# Patient Record
(demographics unavailable — no encounter records)

---

## 2024-10-23 NOTE — CHIEF COMPLAINT
[FreeTextEntry1] : LMP 8/10/24 Discontinued OC's on 8/10/24 Pregnancy test in office this am: negative c/o RLQ pain x 3 weeks intermittent but only cramping at the present time.  C/o vaginal itching x 1 1/2 weeks

## 2024-10-23 NOTE — PHYSICAL EXAM
[Chaperone Present] : A chaperone was present in the examining room during all aspects of the physical examination [09257] : A chaperone was present during the pelvic exam. [Soft] : soft [Normal] : uterus [Discharge] : a  ~M vaginal discharge was present [Moderate] : moderate [White] : white [Thin] : thin [Uterine Adnexae] : were not tender and not enlarged [FreeTextEntry2] : Marina [Tender] : non tender [Distended] : not distended [H/Smegaly] : no hepatosplenomegaly

## 2025-01-30 NOTE — DISCUSSION/SUMMARY
[FreeTextEntry1] : Discuss persistent HPV Discuss pregnancy with irregular cycles Discuss discharge and chronic BV

## 2025-01-30 NOTE — HISTORY OF PRESENT ILLNESS
[FreeTextEntry1] : The patient is here for a routine gynecological examination with Pap smear.  Her LMP was 5 weeks ago   Her periods come every 40 days   She has lost weight recently and under stress.  LH surge was today   She is using pull out for birth control     She has no recent gynecological or urinary problems

## 2025-01-30 NOTE — PHYSICAL EXAM
[Chaperone Present] : A chaperone was present in the examining room during all aspects of the physical examination [65890] : A chaperone was present during the pelvic exam. [FreeTextEntry2] : Mikki [Appropriately responsive] : appropriately responsive [Alert] : alert [No Acute Distress] : no acute distress [No Lymphadenopathy] : no lymphadenopathy [Regular Rate Rhythm] : regular rate rhythm [No Murmurs] : no murmurs [Clear to Auscultation B/L] : clear to auscultation bilaterally [Soft] : soft [Non-tender] : non-tender [Non-distended] : non-distended [No HSM] : No HSM [No Lesions] : no lesions [No Mass] : no mass [Oriented x3] : oriented x3 [Examination Of The Breasts] : a normal appearance [No Masses] : no breast masses were palpable [Labia Majora] : normal [Labia Minora] : normal [Discharge] : a  ~M vaginal discharge was present [Moderate] : moderate [Foul Smelling] : not foul smelling [White] : white [Thick] : thick [Normal] : normal [Uterine Adnexae] : normal

## 2025-04-16 NOTE — PHYSICAL EXAM
[Chaperoned Physical Exam] : A chaperone was present in the examining room during all aspects of the physical examination. [MA] : MA [Normal] : uterus [Discharge] : a  ~M vaginal discharge was present [Moderate] : moderate [White] : white [Thick] : thick [No Bleeding] : there was no active vaginal bleeding [Uterine Adnexae] : were not tender and not enlarged [FreeTextEntry2] : Adilene

## 2025-04-16 NOTE — CHIEF COMPLAINT
[FreeTextEntry1] : c/o vaginal discharged and itching x 6 days improved with a Diflucan 150 mg on 4/12/25.

## 2025-07-15 NOTE — PHYSICAL EXAM
[MA] : MA [FreeTextEntry2] : Ana Maria [Labia Majora Erythema] : erythema of the labia majora [Labia Minora Erythema] : erythema of the labia minora [Normal] : uterus [Erythema] : erythema [Discharge] : a  ~M vaginal discharge was present [Moderate] : moderate [Clear] : clear [Watery] : watery [No Bleeding] : there was no active vaginal bleeding [Uterine Adnexae] : were not tender and not enlarged

## 2025-07-15 NOTE — CHIEF COMPLAINT
[Urgent Visit] : Urgent Visit [FreeTextEntry1] : 27 y/o female, LMP 7/1/25 presents for an urgent visit c/o vaginal itch, cramping, and vaginal irritation.  Also c/o urinary frequency but has chronic OAB.  stopped OCP 1 yr ago and now tracks menses as form of contraception.  ROS:  Denies fever/chills, HA, Cough/sore throat, CP, SOB, N/V, Diarrhea/Constipation.